# Patient Record
Sex: FEMALE
[De-identification: names, ages, dates, MRNs, and addresses within clinical notes are randomized per-mention and may not be internally consistent; named-entity substitution may affect disease eponyms.]

---

## 2019-05-08 PROBLEM — Z00.00 ENCOUNTER FOR PREVENTIVE HEALTH EXAMINATION: Status: ACTIVE | Noted: 2019-05-08

## 2019-05-28 ENCOUNTER — APPOINTMENT (OUTPATIENT)
Dept: PULMONOLOGY | Facility: CLINIC | Age: 69
End: 2019-05-28

## 2019-06-03 ENCOUNTER — APPOINTMENT (OUTPATIENT)
Dept: PULMONOLOGY | Facility: CLINIC | Age: 69
End: 2019-06-03

## 2020-06-16 ENCOUNTER — APPOINTMENT (OUTPATIENT)
Dept: ORTHOPEDIC SURGERY | Facility: CLINIC | Age: 70
End: 2020-06-16
Payer: MEDICARE

## 2020-06-16 VITALS
WEIGHT: 160 LBS | HEIGHT: 65 IN | DIASTOLIC BLOOD PRESSURE: 85 MMHG | TEMPERATURE: 98.1 F | BODY MASS INDEX: 26.66 KG/M2 | HEART RATE: 59 BPM | SYSTOLIC BLOOD PRESSURE: 143 MMHG

## 2020-06-16 DIAGNOSIS — M75.22 BICIPITAL TENDINITIS, LEFT SHOULDER: ICD-10-CM

## 2020-06-16 DIAGNOSIS — G56.22 LESION OF ULNAR NERVE, LEFT UPPER LIMB: ICD-10-CM

## 2020-06-16 PROCEDURE — 99204 OFFICE O/P NEW MOD 45 MIN: CPT

## 2020-06-16 PROCEDURE — 73080 X-RAY EXAM OF ELBOW: CPT | Mod: LT

## 2020-06-16 NOTE — ASSESSMENT
[FreeTextEntry1] : 69-year-old female with previous injury to the left elbow now with symptoms consistent with biceps tendinitis as well as left ulnar nerve irritation. I recommend a course of conservative treatment with anti-inflammatory medication and light activity as tolerated.We will schedule a telephone call for followup in 2 weeks.

## 2020-06-16 NOTE — HISTORY OF PRESENT ILLNESS
[FreeTextEntry1] : 69-year-old female presents for evaluation of left anterior elbow pain as well as left small finger paresthesias. Approximately 3 months ago she had an overuse type injury on a rowing machine and since that time has had pain at the anterior aspect of the elbow as well as constant paresthesias in her small finger. The pain is dull in nature located over the biceps muscle belly with radiation to the forearm. Her pain is worse with activity and improved with rest.

## 2020-06-16 NOTE — PHYSICAL EXAM
[Normal LUE] : Left Upper Extremity: No scars, rashes, lesions, ulcers, skin intact [Normal Finger/nose] : finger to nose coordination [de-identified] : left upper extremity:\par Skin intact no swelling no erythema no ecchymosis\par Positive tenderness over the biceps musculotendinous junction as well as over the distal tendon\par No tenderness at the medial or lateral epicondyles\par moderate pain with resisted supination, no pain with resisted wrist flexion or extension\par Negative Tinel over the elbow, negative elbow flexion compression test\par Range of motion of the elbow wrist and digits intact\par Sensation intact distally with subjective paresthesias in the small finger\par Capillary refill less than 2 seconds [Normal] : no peripheral adenopathy appreciated [de-identified] : oseir [de-identified] : 3 x-ray views of the left elbow are reviewed no acute fracture or dislocation no soft tissue calcification

## 2020-06-17 ENCOUNTER — RX RENEWAL (OUTPATIENT)
Age: 70
End: 2020-06-17

## 2020-06-17 RX ORDER — MELOXICAM 15 MG/1
15 TABLET ORAL
Qty: 90 | Refills: 0 | Status: ACTIVE | COMMUNITY
Start: 2020-06-16 | End: 1900-01-01

## 2020-06-30 ENCOUNTER — APPOINTMENT (OUTPATIENT)
Dept: ORTHOPEDIC SURGERY | Facility: CLINIC | Age: 70
End: 2020-06-30

## 2020-06-30 RX ORDER — MELOXICAM 7.5 MG/1
7.5 TABLET ORAL TWICE DAILY
Qty: 40 | Refills: 0 | Status: ACTIVE | COMMUNITY
Start: 2020-06-30 | End: 1900-01-01

## 2025-07-07 ENCOUNTER — APPOINTMENT (OUTPATIENT)
Dept: CARDIOLOGY | Facility: CLINIC | Age: 75
End: 2025-07-07
Payer: MEDICARE

## 2025-07-07 VITALS
WEIGHT: 166 LBS | BODY MASS INDEX: 27.66 KG/M2 | OXYGEN SATURATION: 100 % | DIASTOLIC BLOOD PRESSURE: 84 MMHG | HEART RATE: 62 BPM | SYSTOLIC BLOOD PRESSURE: 132 MMHG | HEIGHT: 65 IN

## 2025-07-07 PROBLEM — I25.10 ATHEROSCLEROSIS OF CORONARY ARTERY OF NATIVE HEART WITHOUT ANGINA PECTORIS: Status: ACTIVE | Noted: 2025-07-07

## 2025-07-07 PROCEDURE — 93000 ELECTROCARDIOGRAM COMPLETE: CPT

## 2025-07-07 PROCEDURE — G2211 COMPLEX E/M VISIT ADD ON: CPT

## 2025-07-07 PROCEDURE — 99204 OFFICE O/P NEW MOD 45 MIN: CPT

## 2025-07-15 ENCOUNTER — NON-APPOINTMENT (OUTPATIENT)
Age: 75
End: 2025-07-15

## 2025-07-23 ENCOUNTER — NON-APPOINTMENT (OUTPATIENT)
Age: 75
End: 2025-07-23

## 2025-07-23 DIAGNOSIS — E78.5 HYPERLIPIDEMIA, UNSPECIFIED: ICD-10-CM

## 2025-07-23 RX ORDER — ROSUVASTATIN CALCIUM 5 MG/1
5 TABLET, FILM COATED ORAL DAILY
Qty: 90 | Refills: 3 | Status: ACTIVE | COMMUNITY
Start: 2025-07-23 | End: 1900-01-01